# Patient Record
Sex: FEMALE | Race: WHITE | Employment: OTHER | URBAN - METROPOLITAN AREA
[De-identification: names, ages, dates, MRNs, and addresses within clinical notes are randomized per-mention and may not be internally consistent; named-entity substitution may affect disease eponyms.]

---

## 2020-01-20 ENCOUNTER — OFFICE VISIT (OUTPATIENT)
Dept: SURGERY | Age: 45
End: 2020-01-20

## 2020-01-20 VITALS
OXYGEN SATURATION: 100 % | SYSTOLIC BLOOD PRESSURE: 148 MMHG | WEIGHT: 293 LBS | TEMPERATURE: 98.7 F | HEART RATE: 66 BPM | HEIGHT: 69 IN | RESPIRATION RATE: 16 BRPM | DIASTOLIC BLOOD PRESSURE: 81 MMHG | BODY MASS INDEX: 43.4 KG/M2

## 2020-01-20 DIAGNOSIS — E28.2 PCOS (POLYCYSTIC OVARIAN SYNDROME): ICD-10-CM

## 2020-01-20 DIAGNOSIS — K76.0 FATTY LIVER: ICD-10-CM

## 2020-01-20 DIAGNOSIS — E66.01 MORBID OBESITY WITH BODY MASS INDEX OF 40.0-49.9 (HCC): ICD-10-CM

## 2020-01-20 DIAGNOSIS — E66.01 MORBID OBESITY (HCC): Primary | ICD-10-CM

## 2020-01-20 DIAGNOSIS — G47.30 SLEEP APNEA, UNSPECIFIED TYPE: ICD-10-CM

## 2020-01-20 DIAGNOSIS — K21.9 GASTROESOPHAGEAL REFLUX DISEASE WITHOUT ESOPHAGITIS: ICD-10-CM

## 2020-01-20 DIAGNOSIS — I10 ESSENTIAL HYPERTENSION: ICD-10-CM

## 2020-01-20 DIAGNOSIS — N39.3 SUI (STRESS URINARY INCONTINENCE, FEMALE): ICD-10-CM

## 2020-01-20 DIAGNOSIS — E66.01 MORBID OBESITY (HCC): ICD-10-CM

## 2020-01-20 RX ORDER — MELATONIN
DAILY
COMMUNITY

## 2020-01-20 RX ORDER — METOPROLOL SUCCINATE 50 MG/1
TABLET, EXTENDED RELEASE ORAL DAILY
COMMUNITY

## 2020-01-20 RX ORDER — SOLIFENACIN SUCCINATE 5 MG/1
5 TABLET, FILM COATED ORAL DAILY
COMMUNITY

## 2020-01-20 RX ORDER — NITROFURANTOIN 25; 75 MG/1; MG/1
100 CAPSULE ORAL 2 TIMES DAILY
COMMUNITY

## 2020-01-20 RX ORDER — QUETIAPINE FUMARATE 50 MG/1
TABLET, FILM COATED ORAL
COMMUNITY
Start: 2020-01-08

## 2020-01-20 NOTE — PATIENT INSTRUCTIONS
New patient Instructions 1. Ensure all pre-operative insurances requirements are complete (ie; dietary visits, psychology consults, primary care documentation, etc) 2. Adhere to pre-operative weight loss / weight maintenance plan discussed in the office today. 3. Contact the office with any questions on pre-operative clearance issues (ie; cardiology work-up, pulmonary work-up, upper GI study, etc). 4. If a barium upper GI study has been ordered for your evaluation, make sure you are on liquids only the morning of the procedure. Body Mass Index: Care Instructions Your Care Instructions Body mass index (BMI) can help you see if your weight is raising your risk for health problems. It uses a formula to compare how much you weigh with how tall you are. · A BMI lower than 18.5 is considered underweight. · A BMI between 18.5 and 24.9 is considered healthy. · A BMI between 25 and 29.9 is considered overweight. A BMI of 30 or higher is considered obese. If your BMI is in the normal range, it means that you have a lower risk for weight-related health problems. If your BMI is in the overweight or obese range, you may be at increased risk for weight-related health problems, such as high blood pressure, heart disease, stroke, arthritis or joint pain, and diabetes. If your BMI is in the underweight range, you may be at increased risk for health problems such as fatigue, lower protection (immunity) against illness, muscle loss, bone loss, hair loss, and hormone problems. BMI is just one measure of your risk for weight-related health problems. You may be at higher risk for health problems if you are not active, you eat an unhealthy diet, or you drink too much alcohol or use tobacco products. Follow-up care is a key part of your treatment and safety.  Be sure to make and go to all appointments, and call your doctor if you are having problems. It's also a good idea to know your test results and keep a list of the medicines you take. How can you care for yourself at home? · Practice healthy eating habits. This includes eating plenty of fruits, vegetables, whole grains, lean protein, and low-fat dairy. · If your doctor recommends it, get more exercise. Walking is a good choice. Bit by bit, increase the amount you walk every day. Try for at least 30 minutes on most days of the week. · Do not smoke. Smoking can increase your risk for health problems. If you need help quitting, talk to your doctor about stop-smoking programs and medicines. These can increase your chances of quitting for good. · Limit alcohol to 2 drinks a day for men and 1 drink a day for women. Too much alcohol can cause health problems. If you have a BMI higher than 25 · Your doctor may do other tests to check your risk for weight-related health problems. This may include measuring the distance around your waist. A waist measurement of more than 40 inches in men or 35 inches in women can increase the risk of weight-related health problems. · Talk with your doctor about steps you can take to stay healthy or improve your health. You may need to make lifestyle changes to lose weight and stay healthy, such as changing your diet and getting regular exercise. If you have a BMI lower than 18.5 · Your doctor may do other tests to check your risk for health problems. · Talk with your doctor about steps you can take to stay healthy or improve your health. You may need to make lifestyle changes to gain or maintain weight and stay healthy, such as getting more healthy foods in your diet and doing exercises to build muscle. Where can you learn more? Go to http://teresa-olinda.info/. Enter S176 in the search box to learn more about \"Body Mass Index: Care Instructions. \" Current as of: March 28, 2019 Content Version: 12.2 © 9326-1241 [employer], [employer]. Care instructions adapted under license by [employer] (which disclaims liability or warranty for this information). If you have questions about a medical condition or this instruction, always ask your healthcare professional. [address] any warranty or liability for your use of this information. Patient Instructions 1. Continue to monitor carbohydrate and protein intake- remember to keep your           total  carbohydrates to 50 grams or less per day for best results. 2. Remember hydration goals - usually 48 to 64 ounces of liquids per day 3. Continue to work towards exercise goals - minimum 3 days per week of 45          minutes to  1 hour at a time. 4. Remember to take vitamins as directed Supplement Resource Guide Importance of Protein:  
Maintains lean body mass, produces antibodies to fight off infections, heals wounds, minimizes hair loss, helps to give you energy, helps with satiety, and keeping you full between meals. Importance of Calcium: 
Needed for healthy bones and teeth, normal blood clotting, and nervous system functioning, higher risk of osteoporosis and bone disease with non-compliance. Importance of Multivitamins: Many functions. Supply you with extra nutrients that you may be missing from food. May lead to iron deficiency anemia, weakness, fatigue, and many other symptoms with non-compliance. Importance of B Vitamins: 
Important for red blood cell formation, metabolism, energy, and helps to maintain a healthy nervous system. Protein Supplement Find one you like now. Use immediately after surgery. Look for: 
35-50g protein each day from your protein supplement once you reach the progression diet. 0-3 g fat per serving 0-3 g sugar per serving Protein drinks should be split in separate dosages. Recommend: Lifelong 1 year + Calcium Supplement:  
 
Start taking within a month after surgery. Look for: Calcium Citrate Plus D (1500 mg per day) Recommend: Citracal 
 
 . Avoid chocolate chewable calcium. Can use chewable bariatric or [employer] brand or similar chewable. The body cannot absorb more than 500-600 mg @ a time. Take for Life Multi-vitamin Supplement:   
 
1st Month After Surgery: Any complete chewable, such as: Perrymans Complete chewables. Avoid Perryman sours or gummies. They lack iron and other important nutrients and also have added sugar. Continue with chewable vitamin or change to adult complete multivitamin one month after surgery. Menstruating women can take a prenatal vitamin. Make sure has at least 18 mg iron and 471-802 mcg folic acid): Vitamin B12, B Complex Vitamin, and Biotin Start taking within a month after surgery. Vitamin B12:  1000 mcg of Vitamin B12 three times weekly Must take sublingually (meaning you take it under your tongue) or in a liquid drop form for easy absorption. B Complex Vitamin: Take a pill or liquid drop form once daily. Biotin: This vitamin can help prevent hair loss. Recommend 5mg  
(5000 mcg) a day Biotin is Optional

## 2020-01-20 NOTE — PROGRESS NOTES
1. Have you been to the ER, urgent care clinic since your last visit? Hospitalized since your last visit? No    2. Have you seen or consulted any other health care providers outside of the [address] [place] [address] since your last visit? Include any pap smears or colon screening.  No        Chief Complaint   Patient presents with    New Patient

## 2020-01-20 NOTE — PROGRESS NOTES
Bariatric Surgery Consultation    Subjective: The patient is a 45 y.o. obese female with a Body mass index is 46.16 kg/m². .  The patient is at her heaviest weight for the past many years. she has been overweight since age 10.   she has been considering surgery since 8 years ago since having twins. she desires surgery at this time because of multiple health concerns and their lifestyle issues which are hindered by their weight. she has been referred by his family physician Dr Solo Barron for evaluation and treatment of their obesity via surgical intervention. Shameka Anne has tried multiple diets in her lifetime most recently tried physician supervised, behavior modification, unsupervised diets, Weight Watchers and STRIVE program with [facility].    Bariatric comorbidities present are   Patient Active Problem List   Diagnosis Code    Morbid obesity with body mass index of 40.0-49.9 ([facility]) E66.01    GERD (gastroesophageal reflux disease) K21.9    Hypertension I10    Adult ADHD F90.9    DEE (stress urinary incontinence, female) N39.3    Sleep apnea G47.30    Arthritic-like pain M25.50    PCOS (polycystic ovarian syndrome) E28.2    Migraine G43.909    Fatty liver K76.0    Chronic UTI N39.0    Morbid obesity ([facility]) E66.01    Degenerative disc disease, lumbar M51.36       The patient is considering laparoscopic gastric bypass surgery for surgical weight loss due to their ineffective progress with medical forms of weight loss and the urging of their physician who cares for their primary medical issues. The patient  now presents  for consideration for weight loss surgery understanding the benefits of this over a medical approach of weight loss as was discussed in our presentation on weight loss surgery. They have discussed their plans both with their family and primary care physician who is in support of their pursuit of such.  The patient has not had health issues as of late and denies and gastrointestinal disturbances other than what is outlined below in their review of symptoms. All of their prior evaluations available by both their PCP's and specialists physicians have been reviewed today either in the Care Everywhere portal or scanned under the media tab. I have spent a large portion of my initial consultation today reviewing the patients current dietary habits which have contributed to their health issues and obesity. I have suggested to them personally a dietary regimen that they can initiate now to help with their status as it pertains to their weight. They understand that the most important aspect of their journey through their weight loss endeavor will be their adherence to a new lifestyle of healthy eating behavior. They also understand that an adherence to an exercise program will not only help with weight loss but is ultimately important in weight maintenance. The patients goal weight is 189lb. (really, just wants to be able to have less pain and move more easily)  These goals are consistent with expected outcomes of their desired operation. her Medical goals are resolution of these health issues.       Patient Active Problem List    Diagnosis Date Noted    Morbid obesity ([address] 2020    Degenerative disc disease, lumbar     Morbid obesity with body mass index of 40.0-49.9 (H[facility])     GERD (gastroesophageal reflux disease)     Hypertension     Adult ADHD     DEE (stress urinary incontinence, female)     Sleep apnea     Arthritic-like pain     PCOS (polycystic ovarian syndrome)     Migraine     Fatty liver     Chronic UTI       Past Surgical History:   Procedure Laterality Date    DILATION AND CURETTAGE      HX ORTHOPAEDIC Left     achilles tendon repair    HX PARTIAL HYSTERECTOMY      LAPAROSCOPY ABDOMEN DIAGNOSTIC      X 2 for emdometriosis    MULTIPLE DELIVERY       X 3      Social History     Tobacco Use    Smoking status: Never Smoker    Smokeless tobacco: Never Used   Substance Use Topics    Alcohol use: Not Currently     Alcohol/week: 0.0 standard drinks      Family History   Problem Relation Age of Onset    Hypertension Mother     Heart Attack Father         age 48    Cancer Sister         bladder, age 25      Current Outpatient Medications   Medication Sig Dispense Refill    QUEtiapine (SEROQUEL) 50 mg tablet       cholecalciferol (VITAMIN D3) (1000 Units /25 mcg) tablet Take  by mouth daily.  metoprolol succinate (TOPROL-XL) 50 mg XL tablet Take  by mouth daily.  nitrofurantoin, macrocrystal-monohydrate, (MACROBID) 100 mg capsule Take 100 mg by mouth two (2) times a day.  solifenacin (VESICARE) 5 mg tablet Take 5 mg by mouth daily.  amphetamine-dextroamphetamine XR (ADDERALL XR) 20 mg XR capsule Take 20 mg by mouth every morning.  dextroamphetamine-amphetamine (ADDERALL) 10 mg tablet Take 10 mg by mouth.  buPROPion XL (WELLBUTRIN XL) 300 mg XL tablet Take 300 mg by mouth every morning.  esomeprazole (NEXIUM) 20 mg capsule Take  by mouth daily.  ATENOLOL PO Take  by mouth.        No Known Allergies       Review of Systems:            General - No history or complaints of unexpected fever, chills, or weight loss  Head/Neck - No history or complaints of headache, diplopia, dysphagia, hearing loss  Cardiac - No history or complaints of chest pain, palpitations, murmur, or shortness of breath  Pulmonary - No history or complaints of shortness of breath, productive cough, hemoptysis  Gastrointestinal - has reflux noted which is controlled with PPI,no  abdominal pain, obstipation/constipation or blood per rectum  Genitourinary - No history or complaints of hematuria/dysuria, stress urinary incontinence symptoms, or renal lithiasis  Musculoskeletal - has joint pain in their low back,  no muscular weakness  Hematologic - No history or complaints of bleeding disorders,  No blood transfusions  Neurologic - No history or complaints of migraine headaches, seizure activity, syncopal episodes, TIA or stroke  Integumentary - No history or complaints of rashes, abnormal nevi, skin cancer  Gynecological - No abnormal bleeding s/p hysterectomy           Objective:     Visit Vitals  /81 (BP 1 Location: Left arm, BP Patient Position: Sitting)   Pulse 66   Temp 98.7 °F (37.1 °C)   Resp 16   Ht 5' 9\" (1.753 m)   Wt 141.8 kg (312 lb 9.6 oz)   SpO2 100%   BMI 46.16 kg/m²       Physical Examination: General appearance - alert, well appearing, and in no distress and oriented to person, place, and time  Mental status - alert, oriented to person, place, and time, normal mood, behavior, speech, dress, motor activity, and thought processes  Eyes - pupils equal and reactive, extraocular eye movements intact, sclera anicteric, left eye normal, right eye normal  Ears - bilateral TM's and external ear canals normal, right ear normal, left ear normal  Nose - normal and patent, no erythema, discharge or polyps  Mouth - mucous membranes moist, pharynx normal without lesions  Neck - supple, no significant adenopathy  Lymphatics - no palpable lymphadenopathy, no hepatosplenomegaly  Chest - clear to auscultation, no wheezes, rales or rhonchi, symmetric air entry  Heart - normal rate, regular rhythm, normal S1, S2, no murmurs, rubs, clicks or gallops  Abdomen - soft, nontender, nondistended, no masses or organomegaly  Back exam - full range of motion, no tenderness, palpable spasm or pain on motion  Neurological - alert, oriented, normal speech, no focal findings or movement disorder noted  Musculoskeletal - no joint tenderness, deformity or swelling  Extremities - peripheral pulses normal, no pedal edema, no clubbing or cyanosis  Skin - normal coloration and turgor, no rashes, no suspicious skin lesions noted    Labs:     Lab Results   Component Value Date/Time    WBC 10.8 08/12/2011 06:16 PM    HGB 13.4 08/12/2011 06:16 PM    HCT 38.7 08/12/2011 06:16 PM    PLATELET 159 08/12/2011 06:16 PM    MCV 86.2 08/12/2011 06:16 PM     Lab Results   Component Value Date/Time    Sodium 138 05/10/2011 08:58 AM    Potassium 3.7 05/10/2011 08:58 AM    Chloride 104 05/10/2011 08:58 AM    CO2 28 05/10/2011 08:58 AM    Anion gap 6 05/10/2011 08:58 AM    Glucose 90 05/10/2011 08:58 AM    BUN 7 05/10/2011 08:58 AM    Creatinine 0.8 05/10/2011 08:58 AM    BUN/Creatinine ratio 9 (L) 05/10/2011 08:58 AM    GFR est AA >60 05/10/2011 08:58 AM    GFR est non-AA >60 05/10/2011 08:58 AM    Calcium 8.5 05/10/2011 08:58 AM    Bilirubin, total 0.6 05/10/2011 08:58 AM    AST (SGOT) 15 05/10/2011 08:58 AM    Alk. phosphatase 50 05/10/2011 08:58 AM    Protein, total 7.0 05/10/2011 08:58 AM    Albumin 3.5 05/10/2011 08:58 AM    Globulin 3.5 05/10/2011 08:58 AM    A-G Ratio 1.0 05/10/2011 08:58 AM    ALT (SGPT) 40 05/10/2011 08:58 AM     No results found for: IRON, FE, TIBC, IBCT, PSAT, FERR  No results found for: FOL, RBCF  No results found for: VITD3, XQVID2, XQVID3, XQVID, VD3RIA              Assessment:     Morbid obesity with associated comorbidity    Plan:     laparoscopic gastric bypass surgery    This is a 45 y.o. female with a BMI of Body mass index is 46.16 kg/m². and the weight-related co-morbidties . Erika Segal meets the NIH criteria for bariatric surgery based upon the BMI of Body mass index is 46.16 kg/m². and multiple weight-related co-morbidties. Erika Segal has elected laparoscopic gastric bypass as her intervention of choice for treatment of morbid obestiy through surgical means secondary to its uniform results,  profound baseline suppression of hunger and pace at which weight is lost.    In the office today, following Charlene's history and physical examination, a 30 minute discussion regarding the anatomic alterations for the laparoscopic gastric bypass  was undertaken.  The dietary expectations and the patient  dependent factors for success were thoroughly discussed, to include the need for interval follow-up and long-term dietary changes associated with success. The possible short and long term  complications of the gastric bypass were also discussed, to include but not limited to;death, DVT/PE, staple line leak, bleeding, stricture formation, infection,internal hernia  and pouch dilation. Specific weight related outcomes for success were also discussed with an emphasis on careful and close follow-up with the first year and dietary behavior modification over the first years as baseline cyclical hunger returns  The patient expressed an understanding of the above factors, and her questions were answered in their entirety. In addition, the patient attended a 1.5 hour power point seminar regarding obesity, surgical weight loss including, adjustable gastric band, gastric bypass, and sleeve gastrectomy. This discussion contrasted the different surgical techniques, mechanisms of actions and expected outcomes, and surgical and medical risks associated with each procedure. During this seminar, there was a long question and answer session where each questions was answered until there were no additional questions. Today, the patient had all of her questions answered and desires to proceed with  bariatric surgery initially choosing the gastric bypass as her surgical option. Secondary Diagnoses:     Dietary Intervention  - The patient is currently scheduled to see or has been followed by a bariatric nutritionist for an attempt at preoperative weight loss as has been dictated by their insurance carrier. They will be assessed at various times during their follow up to evaluate their progress depending on the length of time that is required once again by their carrier.   I have explained the importance of preoperative weight loss and the benefits regarding lower surgical risk and also assisting the patient in reaching their weight loss goal.  Finally they understand there is a physiologic benefit from the standpoint of hepatic volume reduction and reduction of central visceral adiposity preoperatively. I have reiterated the importance of a low carbohydrate and high protein regimen to achieve their stated goal. I have reviewed their current eating behavior prior to this encounter and explained to them in an exhaustive fashion the appropriate diet that they should adhere to. They have been encouraged to loose weight pre operatively and understand it is our prerogative to cancel surgery or postpone their procedure in the event of significant weight gain. The patients weight loss goal pre operatively is 10 pounds. GERD -The patient understands that weight loss surgery is not a guaranteed cure for reflux disease but does understand the benefits that weight loss can have on reflux disease. They also understand that at the time of surgery the gastroesophageal junction will be evaluated for the presence of a diaphragmatic hernia. Hernias will be corrected always with the gastric band and sleeve gastrectomy procedures, but only on a case by case basis with the gastric bypass if it prevents our ability to perform the operation at hand. The patient also understands that neither weight loss surgery nor repair of a diaphragmatic hernia repair guarantees the complete cessation of the disease. They also understand there is a possibility of recurrence with a simple crural repair as is performed with these procedures. They understand they may have to continue their medications in the postoperative period.      Hypertension - We will monitor the patients blood pressure while in the hospital and the plan would be to continue those medications postoperatively. If a diuretic is being used we will stop them on discharge to prevent dehydration particularly with the sleeve gastrectomy and the gastric bypass procedures.   They will be instructed to monitor their blood pressure postoperatively while at home and notify their primary care physician in the event of any significantly high or uncharacteristic readings.     Obstructive Sleep Apnea -The patient understands the association of sleep apnea and obesity and the additional risk that it caries related to post surgical complications. We will have the patient bring their CPAP machine to the hospital for use both postoperatively in the PACU and on the floor at its appropriate setting. We will have them continue using it while at home after surgery and follow up with their pulmonologist 6 months after to be retested to see if it can be discontinued at that time period.     Polycystic Ovarian Syndrome -The patient does understand the association between obesity and the development of PCOS. They understand that weight loss can often improve symptoms and in many cases cure the disease. If the disease is associated with infertility this too is often corrected with adequate weight loss. The patient understands that any change to the pharmaceutical treatment of her PCOS will be managed by the primary care physician or OB/GYN.     Weight Related Arthritis -The patient understands the benefits that weight loss surgery can have on their arthritis but also understands that weight loss is not a guaranteed cure and relief of symptoms is often dependent on the severity of the underlying disease. The patient also understands that traditional pharmaceutical treatments for this diagnosis are usually unavailable to post-operative weight loss patients due to the effects on the gastrointestinal tract particularly with the gastric bypass and to a lesser effect with the sleeve gastrectomy. Any changes to the patients medication treatment will ultimately be made the patients PCP with input by our office.         Signed By: Le Esposito MD     January 20, 2020

## 2020-02-05 ENCOUNTER — APPOINTMENT (OUTPATIENT)
Dept: GENERAL RADIOLOGY | Age: 45
End: 2020-02-05
Attending: SPECIALIST
Payer: MEDICAID

## 2020-02-05 ENCOUNTER — HOSPITAL ENCOUNTER (OUTPATIENT)
Age: 45
Setting detail: OUTPATIENT SURGERY
Discharge: HOME | End: 2020-02-05
Attending: SPECIALIST | Admitting: SPECIALIST
Payer: MEDICAID

## 2020-02-05 VITALS
HEART RATE: 80 BPM | BODY MASS INDEX: 43.4 KG/M2 | RESPIRATION RATE: 16 BRPM | SYSTOLIC BLOOD PRESSURE: 173 MMHG | WEIGHT: 293 LBS | OXYGEN SATURATION: 100 % | TEMPERATURE: 97.9 F | HEIGHT: 69 IN | DIASTOLIC BLOOD PRESSURE: 116 MMHG

## 2020-02-05 DIAGNOSIS — E66.01 MORBID OBESITY (HCC): ICD-10-CM

## 2020-02-05 PROCEDURE — 74240 X-RAY XM UPR GI TRC 1CNTRST: CPT

## 2020-02-05 PROCEDURE — 74011000255 HC RX REV CODE- 255: Performed by: SPECIALIST

## 2020-02-05 PROCEDURE — 76040000019: Performed by: SPECIALIST

## 2020-02-05 PROCEDURE — 77030040361 HC SLV COMPR DVT MDII -B: Performed by: SPECIALIST

## 2020-02-05 NOTE — PROCEDURES
Patient:Charlene Cervantes   : 1975  Medical Record [id number]            PREPROCEDURE DIAGNOSIS: This patient is preoperative for laparoscopic gastric bypass surgeryprocedure with a history of  reflux disease. POSTPROCEDURE DIAGNOSIS: This patient is preoperative for laparoscopic gastric bypass surgeryprocedure with a history of  reflux disease. PROCEDURES PERFORMED: Upper GI study with barium. ESTIMATED BLOOD LOSS: None. SPECIMENS: None. STATEMENT OF MEDICAL NECESSITY: The patient is a patient with a  longstanding history of obesity. They are now considering the laparoscopic gastric bypass surgeryprocedure as a means of surgical weight control and due to their history of reflux disease and are being assessed preoperatively for such. DESCRIPTION OF PROCEDURE: The patient was brought to the fluoroscopy unit and  was given thin barium. On swallowing of barium, they were noted to have  normal peristalsis of their esophagus. They had prompt filling of distal  esophagus with tapering into the gastroesophageal junction. There was no evidence of a hiatal hernia present. Contrast then filled the gastric cardia, fundus,body and pre pyloric region with no abnormalities noted. Contrast then exited the pylorus in normal fashion. No obstruction was noted. There was no evidence of reflux noted. (normal anatomy -  / 118, pt instructed to either go to PCP or the THE [facility] ER.   Educated in risks of CVA or MI with readings this high)    Mylene Henry MD

## 2020-02-05 NOTE — NURSE NAVIGATOR
Patient is going to ED for further assessment due to her HTN as per Dr. Ahmet Hoff and patient's PCP's request.

## 2020-02-07 ENCOUNTER — TELEPHONE (OUTPATIENT)
Dept: SURGERY | Age: 45
End: 2020-02-07

## 2020-02-07 NOTE — PROGRESS NOTES
Progress Note  02/07/20    This is an update from her appearance at our clinic on Wednesday afternoon. As has been indicated in her chart, she presented for her upper GI study and had several blood pressures which were quite high. At that time she apparently told us that she had taken some Sudafed and was on Adderall for her issues with concentration and she had been taken off of one of her blood pressure medications by her family [de-identified] office. At this juncture we told her to go down to the emergency room immediately for evaluation for her symptoms, and apparently unbeknownst to us she left the hospital, ran some errands, and eventually ended up in the emergency room at [facility] and was admitted for observation. Based on her symptoms at that time, the physicians felt like she might have a stroke, but she has no symptoms after talking with her  overtly and she is still in the hospital today. I did put a call in to her family [de-identified] office so that I could discuss her care with her and also the medications she is currently on.

## 2020-02-07 NOTE — PROGRESS NOTES
Progress Note  02/07/20    I made a phone call to Ms. Demario Ellison' , (I believe his name is Adriana Velez), regarding a phone call we received from [facility] today. As per her notes in our chart, when she presented to our clinic she was significantly hypertensive and we checked the blood pressure many times with the same result. She had been on some Sudafed and of course she is on her normal dose of Adderall, and I do believe she said she missed her Inderal dose that morning, and when her blood pressure did not come down we had her call her family [de-identified] office to see if they would like her to go there or to the emergency room, and they indicated that she should go to the emergency room, which was our initial recommendation also. We did direct her to the emergency room at [facility]. Apparently she went to the registration and decided that she would leave the hospital and run some errands, and then apparently later that night ended up in the emergency room at [facility] with persistent headache. According to the , after my discussion with him, they admitted her for observation to manage her blood pressure. They obtained an EKG on her and a CT scan of the head and he is unclear as to what the CT of the head showed. She is still admitted to the hospital, she apparently is acting completely normally, but he did state they felt like she would likely be discharged within the next 24 hours or so. I asked him who their family physician was and it was of course, as I stated previously, [facility], and I went ahead and called the nurse practitioner who takes care of her and according to him, he was unaware of her elevated blood pressure on Wednesday after she called the office, and certainly his recommendation would have been to go to the emergency room also.   The reason for my call to him today was simply to point out the fact that she is on Adderall and I was unsure as to whether or not he knew that, as she did indicate it was prescribed by another provider. I also mentioned the fact that she stated to us on Wednesday that someone had stopped one of her blood pressure medications, which she had previously been on. He indicated to me that her blood pressures in his office had been relatively normal and that once she is discharged from the hospital they would check with her and get her in for an appointment to make sure she is appropriately managed now that she has been admitted to [facility]. He will let us know if anything else changes. I have also talked with her  once again and he will let us know if anything changes.

## 2020-02-07 NOTE — TELEPHONE ENCOUNTER
Nurse from pt PCP (Dr. Elvia Armando) office called. She wanted to let us know that pt was admitted to [facility] yesterday to Rule out Stroke. I told her that I will forward this to Dr Mami Velasquez.      Dr Loyda Ashley office phone [phone]  Fax [phone]

## 2020-05-11 ENCOUNTER — CLINICAL SUPPORT (OUTPATIENT)
Dept: SURGERY | Age: 45
End: 2020-05-11

## 2020-05-11 VITALS — BODY MASS INDEX: 43.4 KG/M2 | HEIGHT: 69 IN | WEIGHT: 293 LBS

## 2020-05-11 DIAGNOSIS — E66.01 MORBID OBESITY (HCC): Primary | ICD-10-CM

## 2020-05-11 NOTE — PROGRESS NOTES
Medical Weight Loss Multi-Disciplinary Program    Name: Latisha Callahan   : 1975    Session# 1  Date: 2020     Height: 5' 9\" (175.3 cm)    Weight: 141.5 kg (312 lb) lbs. Body mass index is 46.07 kg/m². 10 lb weight loss goal     Dietitian: Padmini Eugene is a 45 y.o. female who present for a pre-op evaluation. Visit Vitals  Ht 5' 9\" (1.753 m)   Wt 141.5 kg (312 lb)   BMI 46.07 kg/m²     Past Medical History:   Diagnosis Date    Adult ADHD     Arthritic-like pain     Chronic UTI     Degenerative disc disease, lumbar     Depression     with anxiety    Fatty liver     dx via ultrasound    GERD (gastroesophageal reflux disease)     Hypertension     Migraine     Morbid obesity with body mass index of 40.0-49.9 (HCC)     PCOS (polycystic ovarian syndrome)     Sleep apnea     not using CPAP    DEE (stress urinary incontinence, female)            Procedure:  laparoscopic gastric bypass surgery     Reasons for Surgery:  BMI > 40 with one or more medically significant comorbidities, HTN and TAMMY    Summary:  Pt given brief pre/post-op diet ed and diet hx reviewed. Pt instructed to follow a low calorie, low carbohydrate, high protein diet of about 5899-3240 calories daily. Pt set several goals. See below. What have you done in the past to try to lose weight? Weight Watchers, Formula 3, Physician supervised weight loss with nutrition education and supervised exercise program     Why didn't you lose weight or keep the weight off?: Patient has been to lose 20-30 lbs, she has been unable to sustain weight loss longer than 6 months    Why do you think having weight loss surgery will make it possible for you to lose weight and keep it off? Patient wants to improve total health, hopes weight loss will improve mobility and in turn and help manage her weight long term. Feels that portion control will ultimately help her manage weight     Dietary Instructions    Nutritional Hx:   What is the number of meals you eat per day? 3  Comment:     Do you eat between meals / snack? no  Typical snack: not regularly, occasionally after dinner not regularly snacking     How fast do you eat your meals? rapid    How often do you eat fast food? occasionally- prior to COVID-19 eating 4 times per week     How many sodas/sugared beverages do you drink per day? None -crystal light OR diet soda    How many caffeinated drinks do you have per day? Rarely has caffeine     How much milk and/or juice do you drink per day? Juice 2-3 times per month    How much water do you drink per day? 4 (8oz glasses)    How often do you consume alcohol? never;     Current Vitamins: Fish oil, Women's MVI     Diet History:    Typical intake is as follows:  Breakfast: wakes at 6:00 am - eats 8:30-9:00 am; Cereal (honey nut cheerios with 2 % milk- large bowl) OR Eggs - 2 with 2 slices of toast OR Leftovers from dinner   Lunch: 1:00-3:00 pm - (While kids are home has been cooking at home more) - [place] -Peanut butter and jelly OR Grilled cheese with chips OR 1/2 cup applesauce   Dinner: 6:00 pm - Meatloaf and potato casserole with corn   Snacks: Cake ( due to Mother's day)   Fluids: 32-40 oz water,     Reviewed intake  Understanding low carbohydrates, low sugar, higher protein meals  Understanding proper portions  Dining outside home  Instruction given for personal dietary changes  Discussed perceived compliance  Comments: Pt given brief pre/post-op diet ed and diet hx reviewed.      Patient Education and Materials Provided:  Supplement [facility], B Vitamin Information, MVI Recommendations, Calcium Citrate Information, Bariatric Supplement Companies, Protein Supplement Information, Fluid Requirements, No Caffeine or Carbonation, No Alcohol for One Year Post Op, 3 Balanced Meals a Day, Food Group Guide, Good Choices Dining Out, No Snacks, No Concentrated Sweets, Support System at Home, Exercising, Support Group Information and Addressed Current Habits / Changes to make  Physical Activity/Exercise    Discussed Perceived Compliance  Reasonable Goals Set  Motivation  Comments: none    Exercise:  Do you currently have an exercise routine? no    Goals:   1. Work to increase to 3-4 small meals per day, with planned snacks as needed. Recommend following plate method for meal planning - focusing on lean protein, non-starchy vegetables, and measured amounts of starch. - Goal of  g protein and  g carbohydrate per day. - Recommend continuing protein supplement as meal replacement at least 1x/day  2. Increase non caloric fluid to 64 oz per day. Eliminate caffeine, added sugar, carbonation, and straws.               -Continue to work to decrease sugar sweetened beverages - goal of calorie free beverages only              -Must eliminate caffeine prior to surgery and avoid for ~6-8 weeks  3. Start activity regimen, work to increase ADL              -Try to start walking for at least 30  minutes 4-5 days per week  4. Start Complete MVI    Candidate for surgery (per RD):  PENDING   Dutch Patton RD  05/11/20

## 2020-06-29 ENCOUNTER — CLINICAL SUPPORT (OUTPATIENT)
Dept: SURGERY | Age: 45
End: 2020-06-29

## 2020-06-29 VITALS — BODY MASS INDEX: 46.07 KG/M2 | HEIGHT: 69 IN

## 2020-06-29 DIAGNOSIS — E66.01 MORBID OBESITY (HCC): Primary | ICD-10-CM

## 2020-06-29 NOTE — PROGRESS NOTES
Medical Weight Loss Multi-Disciplinary Program    Name: Vik Choe   : 1975    Session# 2  Date: 2020    Visit Vitals  Ht 5' 9\" (1.753 m)   BMI 46.07 kg/m²     Dietary Instructions    Reviewed intake  Understanding low carbohydrates, low sugar, higher protein meals  Instruction given for personal dietary changes  Discussed perceived compliance  Comments: Diet hx reviewed and personal dietary changes discussed. Reviewed recommendation to follow 1588-5897 calorie diet, working to reduce total carbohydrate intake to  g or less per day and increasing protein intake to  g per day, compared current intake to recommendations. Patient has not yet started reading patient packet and reviewing preoperative goals. RD spent majority of session reviewing importance of carbohydrate counting and following high protein, low carbohydrate meal planning options. Today the majority of our visit was spent reviewing patient's food recall and identifying areas for improvement. Educated  on the importance of eating 3 meals/day at regularly scheduled times including breakfast within 1st 1-2 hours of waking. Suggested patient use a protein supplement as a meal replacement instead of skipping OR as a between meal high protein snack. Also educated o the importance of including a protein with every meal and snack and reviewed lean sources. Lastly introduced  the Plate Method for Meal Planning and reviewed tips for reducing total calories and estimating portions.      Typical intake is as follows:  Breakfast: wakes at 6:00 am - eats 8:61-3:47 am; 1 slice of toast with peanut butter OR  Eggs (2) with cheese   Lunch: 1:00-3:00 pm -Navy beans OR  [place] -Peanut butter and jelly OR Grilled cheese with chips OR 1/2 cup applesauce - Ensure OR Boost meal replacement shake -   Dinner: 6:00 pm - Chicken breast, baked with butter beans   Snacks: Patient reports avoiding snack choice - will have cheese sticks Fluids: 32-40 oz water,       Nutritional Hx: What is the number of meals you eat per day? 3  Comment:     Do you eat between meals / snack? no  Typical snack: not regularly, occasionally after dinner not regularly snacking     How fast do you eat your meals? rapid    How often do you eat fast food? occasionally- prior to COVID-19 eating 4 times per week     How many sodas/sugared beverages do you drink per day? None -crystal light OR diet soda    How many caffeinated drinks do you have per day? Rarely has caffeine     How much milk and/or juice do you drink per day? Juice 2-3 times per month    How much water do you drink per day? 4 (8oz glasses)    How often do you consume alcohol? never;     Current Vitamins: Fish oil, Women's MVI     Physical Activity/Exercise    Discussed Perceived Compliance  Motivation    Patient has not started physical activity regimen, reinforced the importance of regular physical activity for total health and weight management. She has started walking 3 days per week for 1 mile and biking for 1 mile. Behavior Modification    Identify obstacles to trigger change  Achieving/Rewarding goals met  Positive attitude  Comments: Reinforced importance continuing to modify lifestyle patterns and behaviors to promote weight loss and long term weight maintenance       Goals:   1. Work to increase to 3-4 small meals per day, with planned snacks as needed. Recommend following plate method for meal planning - focusing on lean protein, non-starchy vegetables, and measured amounts of starch. - Goal of  g protein and  g carbohydrate per day. - Recommend continuing protein supplement as meal replacement at least 1x/day OR as high protein snack option  2. Increase non caloric fluid to 64 oz per day.   Eliminate caffeine, added sugar, carbonation, and straws.               -Continue to work to decrease sugar sweetened beverages - goal of calorie free beverages only -Must eliminate caffeine prior to surgery and avoid for ~6-8 weeks   -Practice 30:30 rule,  food and flood   3. Start activity regimen, work to increase ADL  4. Start Complete MVI    Candidate for surgery (per RD):  PENDING    Gissell Arboleda    Dietitian: Gissell Arboleda RD

## 2020-08-27 ENCOUNTER — CLINICAL SUPPORT (OUTPATIENT)
Dept: SURGERY | Age: 45
End: 2020-08-27

## 2020-08-27 VITALS — WEIGHT: 293 LBS | BODY MASS INDEX: 43.4 KG/M2 | HEIGHT: 69 IN

## 2020-08-27 DIAGNOSIS — E66.01 MORBID OBESITY (HCC): Primary | ICD-10-CM

## 2020-08-27 NOTE — PROGRESS NOTES
Medical Weight Loss Multi-Disciplinary Program    Name: Merlyn Givens   : 1975    Session# 1 - restart   Date: 2020    Visit Vitals  Ht 5' 9\" (1.753 m)   Wt 142.9 kg (315 lb)   BMI 46.52 kg/m²     Dietary Instructions    Reviewed intake  Understanding low carbohydrates, low sugar, higher protein meals  Instruction given for personal dietary changes  Discussed perceived compliance  Comments: Diet hx reviewed and personal dietary changes discussed. Reviewed recommendation to follow 3711-8302 calorie diet, working to reduce total carbohydrate intake to  g or less per day and increasing protein intake to  g per day, compared current intake to recommendations. Patient has been able to review materials since our last session. Today the majority of our visit was spent reviewing patient's food recall and identifying areas for improvement. Educated  on the importance of eating 3 meals/day at regularly scheduled times including breakfast within 1st 1-2 hours of waking. Suggested patient use a protein supplement as a meal replacement instead of skipping OR as a between meal high protein snack. Also educated o the importance of including a protein with every meal and snack and reviewed lean sources. Lastly introduced  the Plate Method for Meal Planning and reviewed tips for reducing total calories and estimating portions.      Typical intake is as follows:  Breakfast: wakes at 6:00 am - eats 8:30-9:00 am; 1 egg with cheese and 1 slice of brito and 1/2 slice of toast   Lunch: 1:00-3:00 pm -Grilled cheese with chips OR 1/2 cup applesauce- reinforced patient reading labels and tracking her daily protein and carbohydrate intake   Snacks: reports more reactive eating - eating pretzel, popcorn, chips   Dinner: 6:00 pm - Chicken breast, baked with butter beans  OR Ground turkey with lupini bean chili   Snacks: Patient reports avoiding snack choice - will have cheese sticks   Fluids: 32-40 oz water, Nutritional Hx: What is the number of meals you eat per day? 3  Comment:     Do you eat between meals / snack? no  Typical snack: not regularly, occasionally after dinner not regularly snacking     How fast do you eat your meals? rapid    How often do you eat fast food? occasionally- prior to COVID-19 eating 4 times per week     How many sodas/sugared beverages do you drink per day? None -crystal light OR diet soda    How many caffeinated drinks do you have per day? Rarely has caffeine     How much milk and/or juice do you drink per day? Juice 2-3 times per month    How much water do you drink per day? 4 (8oz glasses)    How often do you consume alcohol? never;     Current Vitamins: Fish oil, Women's MVI     Physical Activity/Exercise    Discussed Perceived Compliance  Motivation    Patient has not started physical activity regimen, reinforced the importance of regular physical activity for total health and weight management. She has started walking 3 days per week for 1 mile and biking for 1 mile. Behavior Modification    Identify obstacles to trigger change  Achieving/Rewarding goals met  Positive attitude  Comments: Reinforced importance continuing to modify lifestyle patterns and behaviors to promote weight loss and long term weight maintenance       Goals:   1. Work to increase to 3-4 small meals per day, with planned snacks as needed. Recommend following plate method for meal planning - focusing on lean protein, non-starchy vegetables, and measured amounts of starch. - Goal of  g protein and  g carbohydrate per day. - Recommend continuing protein supplement as meal replacement at least 1x/day OR as high protein snack option  2. Increase non caloric fluid to 64 oz per day.   Eliminate caffeine, added sugar, carbonation, and straws.               -Continue to work to decrease sugar sweetened beverages - goal of calorie free beverages only              -Must eliminate caffeine prior to surgery and avoid for ~6-8 weeks   -Practice 30:30 rule,  food and flood   3. Start activity regimen, work to increase ADL  4. Start Complete MVI    Candidate for surgery (per RD):  PENDING    Natalie Messina    Dietitian: Natalie Messina RD

## 2020-09-29 ENCOUNTER — CLINICAL SUPPORT (OUTPATIENT)
Dept: SURGERY | Age: 45
End: 2020-09-29

## 2020-09-29 VITALS — BODY MASS INDEX: 43.4 KG/M2 | HEIGHT: 69 IN | WEIGHT: 293 LBS

## 2020-09-29 DIAGNOSIS — E66.01 MORBID OBESITY (HCC): Primary | ICD-10-CM

## 2020-09-29 NOTE — PROGRESS NOTES
Medical Weight Loss Multi-Disciplinary Program    Name: Fauzia Bay   : 1975    Session# 2  Date: 2020    Visit Vitals  Ht 5' 9\" (1.753 m)   Wt 142.9 kg (315 lb)   BMI 46.52 kg/m²     Dietary Instructions    Reviewed intake  Understanding low carbohydrates, low sugar, higher protein meals  Instruction given for personal dietary changes  Discussed perceived compliance  Comments: Diet hx reviewed and personal dietary changes discussed. Reviewed recommendation to follow [id number] calorie diet, working to reduce total carbohydrate intake to  g or less per day and increasing protein intake to  g per day, compared current intake to recommendations. RD spent majority of session reviewing importance of carbohydrate counting and following high protein, low carbohydrate meal planning options. Today the majority of our visit was spent reviewing patient's food recall and identifying areas for improvement. Educated  on the importance of eating 3 meals/day at regularly scheduled times including breakfast within 1st 1-2 hours of waking. Suggested patient use a protein supplement as a meal replacement instead of skipping OR as a between meal high protein snack. Also educated o the importance of including a protein with every meal and snack and reviewed lean sources. Lastly introduced  the Plate Method for Meal Planning and reviewed tips for reducing total calories and estimating portions.      Typical intake is as follows:  Breakfast: wakes at 6:00 am - eats 8:30-9:00 am; 2 eggs with 1 piece sausage   Lunch: 1:00-3:00 pm still finding it difficult to manage midday eating timing, Grilled chicken with side salad cucumber, tomatoes and light [employer] dressing/ tay[employer] ranch dressing OR Boost Max Protein shake OR 1 small cup of tomato soup  Dinner: 6:00 pm - Chicken breast, baked with butter beans OR Steak fajitas with peppers/onions - lettuce wrap   Snacks: Patient reports avoiding snack choice - will have cheese sticks   Fluids: 32-40 oz water, - reviewed recommendation for increasing fluid      Nutritional Hx: What is the number of meals you eat per day? 3  Comment:     Do you eat between meals / snack? no  Typical snack: not regularly, occasionally after dinner not regularly snacking     How fast do you eat your meals? rapid    How often do you eat fast food? occasionally- prior to COVID-19 eating 4 times per week     How many sodas/sugared beverages do you drink per day? None -crystal light OR diet soda    How many caffeinated drinks do you have per day? Rarely has caffeine     How much milk and/or juice do you drink per day? Juice 2-3 times per month    How much water do you drink per day? 4 (8oz glasses)    How often do you consume alcohol? never;     Current Vitamins: Fish oil, Women's MVI     Physical Activity/Exercise    Discussed Perceived Compliance  Motivation    Patient has not started physical activity regimen, reinforced the importance of regular physical activity for total health and weight management. She has not been walking, but more active with her kids     Behavior Modification    Identify obstacles to trigger change  Achieving/Rewarding goals met  Positive attitude  Comments: Reinforced importance continuing to modify lifestyle patterns and behaviors to promote weight loss and long term weight maintenance       Goals:   1. Work to increase to 3-4 small meals per day, with planned snacks as needed. Recommend following plate method for meal planning - focusing on lean protein, non-starchy vegetables, and measured amounts of starch. - Goal of  g protein and  g carbohydrate per day. - Recommend continuing protein supplement as meal replacement at least 1x/day OR as high protein snack option  2. Increase non caloric fluid to 64 oz per day.   Eliminate caffeine, added sugar, carbonation, and straws.               -Continue to work to decrease sugar sweetened beverages - goal of calorie free beverages only              -Must eliminate caffeine prior to surgery and avoid for ~6-8 weeks   -Practice 30:30 rule,  food and flood   3. Start activity regimen, work to increase ADL  4. Start Complete MVI    Candidate for surgery (per RD):  PENDING    Jessie Moralez    Dietitian: Jessie Moralez, RD

## 2020-10-27 ENCOUNTER — CLINICAL SUPPORT (OUTPATIENT)
Dept: SURGERY | Age: 45
End: 2020-10-27

## 2020-10-27 VITALS — HEIGHT: 69 IN | BODY MASS INDEX: 43.4 KG/M2 | WEIGHT: 293 LBS

## 2020-10-27 DIAGNOSIS — E66.01 MORBID OBESITY (HCC): Primary | ICD-10-CM

## 2020-10-27 NOTE — PROGRESS NOTES
Medical Weight Loss Multi-Disciplinary Program    Name: Toni Darnell   : 1975    Session# 3  Date: 10/27/2020     Visit Vitals  Ht 5' 9\" (1.753 m)   Wt 142.9 kg (315 lb)   BMI 46.52 kg/m²   Patient did not have update weight     Dietary Instructions    Reviewed intake  Understanding low carbohydrates, low sugar, higher protein meals  Instruction given for personal dietary changes  Discussed perceived compliance  Comments: Diet hx reviewed and personal dietary changes discussed. Reviewed recommendation to follow [id number] calorie diet, working to reduce total carbohydrate intake to  g or less per day and increasing protein intake to  g per day, compared current intake to recommendations. Patient participated in pre-recorded education class video. Recorded video of dietitian discussing key diet principles following weight loss surgery, importance of high protein diet, sources of protein, tips for following low carbohydrate diet, and ways to measure carbohydrates utilizing carbohydrate counting. Discussed importance of sampling protein supplements, protein supplement label guidelines and popularly selected items. Also reviewed the key vitamins and minerals to supplement long term after surgery. But these vitamins will be reviewed again in a pre-operative class. Patient watched the video in its entirety and turned in the 3 embedded passcodes from the video session. Behavior Modification    Identify obstacles to trigger change  Achieving/Rewarding goals met  Positive attitude  Comments: Reinforced importance continuing to modify lifestyle patterns and behaviors to promote weight loss and long term weight maintenance     Comments:  During today's lesson, I also spent some time talking about behavior changes. I talked to patient about the importance of taking vitamins post op and we reviewed the vitamins that patients will be taking post op.   Patient will hear this again at pre op class before surgery. Patient had the opportunity to ask questions about these vitamins that will be lifelong. Goals:   1. Work to increase to 3-4 small meals per day, with planned snacks as needed. Recommend following plate method for meal planning - focusing on lean protein, non-starchy vegetables, and measured amounts of starch. - Goal of  g protein and  g carbohydrate per day. - Recommend continuing protein supplement as meal replacement at least 1x/day OR as high protein snack option  2. Increase non caloric fluid to 64 oz per day. Eliminate caffeine, added sugar, carbonation, and straws.               -Continue to work to decrease sugar sweetened beverages - goal of calorie free beverages only              -Must eliminate caffeine prior to surgery and avoid for ~6-8 weeks   -Practice 30:30 rule,  food and flood   3. Start activity regimen, work to increase ADL  4. Start Complete MVI    Candidate for surgery (per RD):  PENDING    Reg Cons

## 2020-11-18 ENCOUNTER — CLINICAL SUPPORT (OUTPATIENT)
Dept: SURGERY | Age: 45
End: 2020-11-18

## 2020-11-18 VITALS — WEIGHT: 293 LBS | HEIGHT: 69 IN | BODY MASS INDEX: 43.4 KG/M2

## 2020-11-18 DIAGNOSIS — E66.01 MORBID OBESITY (HCC): Primary | ICD-10-CM

## 2020-11-18 NOTE — PROGRESS NOTES
Medical Weight Loss Multi-Disciplinary Program    Name: Zahraa Brambila   : 1975    Session# 4  Date: 2020    Visit Vitals  Ht 5' 9\" (1.753 m)   Wt 139.3 kg (307 lb)   BMI 45.34 kg/m²     Dietary Instructions    Reviewed intake  Understanding low carbohydrates, low sugar, higher protein meals  Instruction given for personal dietary changes  Discussed perceived compliance  Comments: Diet hx reviewed and personal dietary changes discussed. Reviewed recommendation to follow [id number] calorie diet, working to reduce total carbohydrate intake to  g or less per day and increasing protein intake to  g per day, compared current intake to recommendations. RD spent majority of session reviewing importance of carbohydrate counting and following high protein, low carbohydrate meal planning options. Today the majority of our visit was spent reviewing patient's food recall and identifying areas for improvement. Educated  on the importance of eating 3 meals/day at regularly scheduled times including breakfast within 1st 1-2 hours of waking. Suggested patient use a protein supplement as a meal replacement instead of skipping OR as a between meal high protein snack. Also educated o the importance of including a protein with every meal and snack and reviewed lean sources. Lastly introduced  the Plate Method for Meal Planning and reviewed tips for reducing total calories and estimating portions. Today we spent majority of session reviewing key diet principles and beginning to discuss post operative diet advancement guidelines. Reinforcing the importance of adequate hydration and protein intake - emphasizing the role of protein supplements in the post operative diet. Discussed vitamin and mineral supplementation forever following surgery. Encouraged patient to review key diet principles of surgery and we will discuss individual questions or concerns.   Patient was  receptive to education and we will continue to review and reinforce in our follow-up next month. Typical intake is as follows:  Breakfast: wakes at 6:00 am - eats 8:30-9:00 am; 2 eggs with 1 piece sausage   Lunch: 1:00-3:00 pm still finding it difficult to manage midday eating timing, Grilled chicken with side salad cucumber, tomatoes and light [employer] dressing/ [employer] ranch dressing OR Boost Max Protein shake OR 1 small cup of tomato soup  Dinner: 6:00 pm - Chicken breast, baked with butter beans OR Steak fajitas with peppers/onions - lettuce wrap   Snacks: Patient reports avoiding snack choice - will have cheese sticks   Fluids: 40-50 oz water, - reviewed recommendation for increasing fluid, Diet soda       Nutritional Hx: What is the number of meals you eat per day? 3  Comment:     Do you eat between meals / snack? no  Typical snack: not regularly, occasionally after dinner not regularly snacking     How fast do you eat your meals? rapid    How often do you eat fast food? occasionally- prior to COVID-19 eating 4 times per week     How many sodas/sugared beverages do you drink per day? None -crystal light OR diet soda    How many caffeinated drinks do you have per day? Rarely has caffeine     How much milk and/or juice do you drink per day? Juice 2-3 times per month    How much water do you drink per day? 4 (8oz glasses)    How often do you consume alcohol? never;     Current Vitamins: Fish oil, Women's MVI     Physical Activity/Exercise    Discussed Perceived Compliance  Motivation    Patient has not started physical activity regimen, reinforced the importance of regular physical activity for total health and weight management.   She has not been walking, but more active with her kids     Behavior Modification    Identify obstacles to trigger change  Achieving/Rewarding goals met  Positive attitude  Comments: Reinforced importance continuing to modify lifestyle patterns and behaviors to promote weight loss and long term weight maintenance       Goals:   1. Work to increase to 3-4 small meals per day, with planned snacks as needed. Recommend following plate method for meal planning - focusing on lean protein, non-starchy vegetables, and measured amounts of starch. - Goal of  g protein and  g carbohydrate per day. - Recommend continuing protein supplement as meal replacement at least 1x/day OR as high protein snack option  2. Increase non caloric fluid to 64 oz per day. Eliminate caffeine, added sugar, carbonation, and straws.               -Continue to work to decrease sugar sweetened beverages - goal of calorie free beverages only              -Must eliminate caffeine prior to surgery and avoid for ~6-8 weeks   -Practice 30:30 rule,  food and flood   3. Start activity regimen, work to increase ADL  4. Start Complete MVI    Candidate for surgery (per RD):  PENDING    Segundo Genao    Dietitian: Segundo Genao RD

## 2020-12-16 ENCOUNTER — CLINICAL SUPPORT (OUTPATIENT)
Dept: SURGERY | Age: 45
End: 2020-12-16

## 2020-12-16 VITALS — HEIGHT: 69 IN | WEIGHT: 293 LBS | BODY MASS INDEX: 43.4 KG/M2

## 2020-12-16 DIAGNOSIS — E66.01 MORBID OBESITY WITH BODY MASS INDEX OF 40.0-49.9 (HCC): Primary | ICD-10-CM

## 2020-12-16 NOTE — PROGRESS NOTES
Medical Weight Loss Multi-Disciplinary Program    Name: Bruno Galloway   : 1975    Session# 5  Date: 2020    Visit Vitals  Ht 5' 9\" (1.753 m)   Wt 139.3 kg (307 lb)   BMI 45.34 kg/m²       Dietary Instructions    Reviewed intake  Understanding low carbohydrates, low sugar, higher protein meals  Instruction given for personal dietary changes  Discussed perceived compliance  Comments: Diet hx reviewed and personal dietary changes discussed. Reviewed recommendation to follow [id number] calorie diet, working to reduce total carbohydrate intake to  g or less per day and increasing protein intake to  g per day, compared current intake to recommendations. Patient participated in pre-recorded education class video. Recorded video of dietitian discussing role of low carbohydrate diet for promoting weight loss before and after surgery. Reviewed sources of carbohydrates, label reading tips and reviewed exchange list for carbohydrates. Discussed ways to reduce carbohydrates and reviewed example day of high carbohydrate vs. Low carbohydrate diet. This class reviewed materials provided at patients initial appointment and provided in education packet to patient. Patient watched the video in its entirety and turned in the 3 embedded passcodes from the video session. Behavior Modification    Identify obstacles to trigger change  Achieving/Rewarding goals met  Positive attitude  Comments: Reinforced importance continuing to modify lifestyle patterns and behaviors to promote weight loss and long term weight maintenance     Comments:  During today's lesson, primarily discussed carbohydrate counting and label reading     hysical Activity/Exercise    Discussed Perceived Compliance  Motivation    Patient has been educated on the importance of started physical activity regimen, reinforced the importance of regular physical activity for total health and weight management.   Suggested starting exercise regimen of cardiovascular and resistance training for at least 3-5 days per week for 30-60 minutes, patient was receptive to recommendation. Goals:   1. Work to increase to 3-4 small meals per day, with planned snacks as needed. Recommend following plate method for meal planning - focusing on lean protein, non-starchy vegetables, and measured amounts of starch. - Goal of  g protein and  g carbohydrate per day. - Recommend continuing protein supplement as meal replacement at least 1x/day OR as high protein snack option  2. Increase non caloric fluid to 64 oz per day. Eliminate caffeine, added sugar, carbonation, and straws.               -Continue to work to decrease sugar sweetened beverages - goal of calorie free beverages only              -Must eliminate caffeine prior to surgery and avoid for ~6-8 weeks   -Practice 30:30 rule,  food and flood   3. Start activity regimen, work to increase ADL  4. Start Complete MVI    Candidate for surgery (per RD):  PENDING

## 2021-01-28 ENCOUNTER — CLINICAL SUPPORT (OUTPATIENT)
Dept: SURGERY | Age: 46
End: 2021-01-28

## 2021-01-28 VITALS — WEIGHT: 293 LBS | HEIGHT: 69 IN | BODY MASS INDEX: 43.4 KG/M2

## 2021-01-28 DIAGNOSIS — E66.01 MORBID OBESITY WITH BODY MASS INDEX OF 40.0-49.9 (HCC): Primary | ICD-10-CM

## 2021-01-28 NOTE — PROGRESS NOTES
Medical Weight Loss Multi-Disciplinary Program    Name: Leonarda Bear   : 1975    Session# 6  Date: 2021    Visit Vitals  Ht 5' 9\" (1.753 m)   Wt 135.6 kg (299 lb)   BMI 44.15 kg/m²     Dietary Instructions    Reviewed intake  Understanding low carbohydrates, low sugar, higher protein meals  Instruction given for personal dietary changes  Discussed perceived compliance  Comments: Diet hx reviewed and personal dietary changes discussed. Reviewed recommendation to follow [id number] calorie diet, working to reduce total carbohydrate intake to  g or less per day and increasing protein intake to  g per day, compared current intake to recommendations. Today we spent majority of session reviewing key diet principles and beginning to discuss post operative diet advancement guidelines. Reinforcing the importance of adequate hydration and protein intake - emphasizing the role of protein supplements in the post operative diet. Discussed vitamin and mineral supplementation forever following surgery. Reinforced key diet principles following surgery, patient has good understanding and is able to teach back concepts. Reviewed diet progression guide with portions following surgery, discussed week 1-2 liquid diet and weeks 3-4 soft protein diet. Reinforced importance of adequate hydration and protein intake. Answered patient specific questions, reviewed daily schedule, shopping list, and behavior modifications following surgery.        Typical intake is as follows:  Breakfast: wakes at 6:00 am - eats 8:30-9:00 am; 2 eggs with 1 piece sausage   Lunch: 1:00-3:00 pm still finding it difficult to manage midday eating timing, Grilled chicken with side salad cucumber, tomatoes and light [employer] dressing/ [employer] ranch dressing OR Boost Max Protein shake OR 1 small cup of tomato soup  Dinner: 6:00 pm - Chicken breast, baked with butter beans OR Steak fajitas with peppers/onions - lettuce wrap   Snacks: Patient reports avoiding snack choice - will have cheese sticks   Fluids: 60-90 oz water, - reviewed recommendation for increasing fluid, Diet soda       Nutritional Hx: What is the number of meals you eat per day? 3  Comment:     Do you eat between meals / snack? no  Typical snack: not regularly, occasionally after dinner not regularly snacking     How fast do you eat your meals? rapid    How often do you eat fast food? occasionally- prior to COVID-19 eating 4 times per week     How many sodas/sugared beverages do you drink per day? None -crystal light OR diet soda    How many caffeinated drinks do you have per day? Rarely has caffeine     How much milk and/or juice do you drink per day? Juice 2-3 times per month    How much water do you drink per day? 8-10 (8oz glasses)    How often do you consume alcohol? never;     Current Vitamins: Fish oil, Women's MVI     Physical Activity/Exercise    Discussed Perceived Compliance  Motivation    Patient has not started physical activity regimen, reinforced the importance of regular physical activity for total health and weight management. She has not been walking, but more active with her kids     Behavior Modification    Identify obstacles to trigger change  Achieving/Rewarding goals met  Positive attitude  Comments: Reinforced importance continuing to modify lifestyle patterns and behaviors to promote weight loss and long term weight maintenance       Goals:   1. Work to increase to 3-4 small meals per day, with planned snacks as needed. Recommend following plate method for meal planning - focusing on lean protein, non-starchy vegetables, and measured amounts of starch. - Goal of  g protein and  g carbohydrate per day. - Recommend continuing protein supplement as meal replacement at least 1x/day OR as high protein snack option  2. Increase non caloric fluid to 64 oz per day. Eliminate caffeine, added sugar, carbonation, and straws. -Continue to work to decrease sugar sweetened beverages - goal of calorie free beverages only              -Must eliminate caffeine prior to surgery and avoid for ~6-8 weeks   -Practice 30:30 rule,  food and flood   3.  Start activity regimen, work to increase ADL  4. Start Complete MVI    Candidate for surgery (per RD): [address]    Dietitian: Garret Napoles RD

## 2021-08-03 PROBLEM — E66.01 MORBID OBESITY (HCC): Status: RESOLVED | Noted: 2020-01-20 | Resolved: 2021-08-03

## (undated) DEVICE — GARMENT,MEDLINE,DVT,INT,CALF,MED, GEN2: Brand: MEDLINE

## (undated) DEVICE — GOWN ISOLATN REG BLU POLY UNISX W/ THMB LOOP

## (undated) DEVICE — MAJ-1414 SINGLE USE ADPATER BIOPSY VALV: Brand: SINGLE USE ADAPTOR BIOPSY VALVE

## (undated) DEVICE — STERILE POLYISOPRENE POWDER-FREE SURGICAL GLOVES: Brand: PROTEXIS

## (undated) DEVICE — STRAP,POSITIONING,KNEE/BODY,FOAM,4X60": Brand: MEDLINE